# Patient Record
Sex: FEMALE | Race: WHITE
[De-identification: names, ages, dates, MRNs, and addresses within clinical notes are randomized per-mention and may not be internally consistent; named-entity substitution may affect disease eponyms.]

---

## 2021-01-13 PROBLEM — Z00.00 ENCOUNTER FOR PREVENTIVE HEALTH EXAMINATION: Status: ACTIVE | Noted: 2021-01-13

## 2021-01-25 ENCOUNTER — APPOINTMENT (OUTPATIENT)
Dept: BREAST CENTER | Facility: CLINIC | Age: 81
End: 2021-01-25

## 2021-02-18 PROBLEM — Z78.9 NO FAMILY HISTORY OF BREAST CANCER: Status: ACTIVE | Noted: 2021-02-18

## 2021-02-19 ENCOUNTER — APPOINTMENT (OUTPATIENT)
Dept: BREAST CENTER | Facility: CLINIC | Age: 81
End: 2021-02-19
Payer: MEDICARE

## 2021-02-19 VITALS
HEART RATE: 70 BPM | SYSTOLIC BLOOD PRESSURE: 123 MMHG | BODY MASS INDEX: 22.2 KG/M2 | WEIGHT: 130 LBS | DIASTOLIC BLOOD PRESSURE: 70 MMHG | HEIGHT: 64 IN

## 2021-02-19 DIAGNOSIS — Z80.9 FAMILY HISTORY OF MALIGNANT NEOPLASM, UNSPECIFIED: ICD-10-CM

## 2021-02-19 DIAGNOSIS — Z78.9 OTHER SPECIFIED HEALTH STATUS: ICD-10-CM

## 2021-02-19 DIAGNOSIS — Z85.820 PERSONAL HISTORY OF MALIGNANT MELANOMA OF SKIN: ICD-10-CM

## 2021-02-19 DIAGNOSIS — Z85.828 PERSONAL HISTORY OF OTHER MALIGNANT NEOPLASM OF SKIN: ICD-10-CM

## 2021-02-19 PROCEDURE — 99205 OFFICE O/P NEW HI 60 MIN: CPT

## 2021-02-19 RX ORDER — ZOLPIDEM TARTRATE 12.5 MG/1
12.5 TABLET, COATED ORAL
Refills: 0 | Status: ACTIVE | COMMUNITY

## 2021-02-19 RX ORDER — PAROXETINE HYDROCHLORIDE 40 MG/1
TABLET, FILM COATED ORAL
Refills: 0 | Status: ACTIVE | COMMUNITY

## 2021-02-19 RX ORDER — THIAMINE HCL 50 MG
TABLET ORAL
Refills: 0 | Status: ACTIVE | COMMUNITY

## 2021-02-19 NOTE — PAST MEDICAL HISTORY
[Surgical Menopause] : The patient is in surgical menopause [Menarche Age ____] : age at menarche was [unfilled] [Menopause Age____] : age at menopause was [unfilled] [Total Preg ___] : G[unfilled] [Live Births ___] : P[unfilled]  [Abortions ___] : Abortions:[unfilled] [History of Hormone Replacement Treatment] : has no history of hormone replacement treatment [FreeTextEntry7] : yes   [FreeTextEntry6] : no [FreeTextEntry8] : yes

## 2021-02-19 NOTE — HISTORY OF PRESENT ILLNESS
[FreeTextEntry1] : 2/19/21 Patient presents to the office for follow up of findings on routine imaging from Nov 2020. She states her GYN felt a lump in her right breast in Nov and was referred for imaging. Incidentally calcs were found in the right breast which were biopsied and benign however she never had a right US core bx of a breast mass that was recommended. Denies skin changes or nipple discharge. No family history of breast or ovarian cancer. Patient refuses to have US core biopsy. She had very large hematoma after the first one due to being on three blood thinners. She cannot recall name of the medications and what hypercoaguable disorder she was diagnosed with.\par \par CHRISTIANO Lifetime risk: 2.2 %\par \par 10/24/20 B/l MG- R- asymmetry & indeterminate cluster of calcs UOQ rec additional views, L- wnl, BIRADS 0\par 11/13/20 R MG & US- MG- 9:00 asymmetry effaces on spot compression, 0.4 cm cluster of coarse heterogeneous calcs UOQ rec stereo bx; US- R- 0.9 cm lobulated nodule 9:00 5FN rec US core bx, L- wnl, BIRADS 4\par 11/23/20 R stereo bx of UOQ calcs path- hyalinized fibroadenoma a/w calcs, bar clip, concordant, approx 7 cm post bx hematoma\par

## 2021-02-19 NOTE — REVIEW OF SYSTEMS
[Cough] : cough [Fever] : no fever [Chills] : no chills [Loss Of Hearing] : no hearing loss [Shortness Of Breath] : no shortness of breath [Abdominal Pain] : no abdominal pain [Vomiting] : no vomiting [Skin Lesions] : no skin lesions [Skin Wound] : no skin wound [Confused] : no confusion [Difficulty Walking] : no difficulty walking [Anxiety] : no anxiety [Depression] : no depression [Swollen Glands In The Neck] : no swollen glands in the neck [de-identified] : +hypercoaguable disorder

## 2021-02-19 NOTE — PHYSICAL EXAM
[Normocephalic] : normocephalic [Atraumatic] : atraumatic [EOMI] : extra ocular movement intact [Supple] : supple [No Supraclavicular Adenopathy] : no supraclavicular adenopathy [No Thyromegaly] : no thyromegaly [No Edema] : no edema [No Swelling] : no swelling [No Rashes] : no rashes [No Ulceration] : no ulceration [de-identified] : nonlabored respirations [de-identified] : no palpable masses, adenopathy or discharge bilaterally [de-identified] : ~ 1 cm palpable mass UOQ, no adenopathy or discharge [de-identified] : no palpable masses, adenopathy or discharge

## 2021-05-25 PROBLEM — N63.0 BREAST NODULE: Status: ACTIVE | Noted: 2021-01-24

## 2021-05-25 PROBLEM — D68.69 SECONDARY HYPERCOAGULABILITY DISORDER: Status: ACTIVE | Noted: 2021-02-19

## 2021-05-25 PROBLEM — R92.1 BREAST CALCIFICATIONS: Status: ACTIVE | Noted: 2021-01-24

## 2021-05-27 ENCOUNTER — APPOINTMENT (OUTPATIENT)
Dept: BREAST CENTER | Facility: CLINIC | Age: 81
End: 2021-05-27

## 2021-05-27 DIAGNOSIS — R92.1 MAMMOGRAPHIC CALCIFICATION FOUND ON DIAGNOSTIC IMAGING OF BREAST: ICD-10-CM

## 2021-05-27 DIAGNOSIS — D68.69 OTHER THROMBOPHILIA: ICD-10-CM

## 2021-05-27 DIAGNOSIS — N63.0 UNSPECIFIED LUMP IN UNSPECIFIED BREAST: ICD-10-CM
